# Patient Record
Sex: MALE | HISPANIC OR LATINO | URBAN - METROPOLITAN AREA
[De-identification: names, ages, dates, MRNs, and addresses within clinical notes are randomized per-mention and may not be internally consistent; named-entity substitution may affect disease eponyms.]

---

## 2019-02-12 ENCOUNTER — OFFICE VISIT CONVERTED (OUTPATIENT)
Dept: PULMONOLOGY | Facility: CLINIC | Age: 39
End: 2019-02-12
Attending: INTERNAL MEDICINE

## 2019-05-07 ENCOUNTER — OFFICE VISIT CONVERTED (OUTPATIENT)
Dept: PULMONOLOGY | Facility: CLINIC | Age: 39
End: 2019-05-07
Attending: PHYSICIAN ASSISTANT

## 2019-09-26 ENCOUNTER — OFFICE VISIT CONVERTED (OUTPATIENT)
Dept: PULMONOLOGY | Facility: CLINIC | Age: 39
End: 2019-09-26
Attending: PHYSICIAN ASSISTANT

## 2020-06-09 ENCOUNTER — OFFICE VISIT CONVERTED (OUTPATIENT)
Dept: PULMONOLOGY | Facility: CLINIC | Age: 40
End: 2020-06-09
Attending: NURSE PRACTITIONER

## 2021-05-28 VITALS
DIASTOLIC BLOOD PRESSURE: 80 MMHG | HEART RATE: 68 BPM | SYSTOLIC BLOOD PRESSURE: 111 MMHG | HEIGHT: 70 IN | WEIGHT: 161 LBS | TEMPERATURE: 98.1 F | OXYGEN SATURATION: 99 % | HEART RATE: 64 BPM | RESPIRATION RATE: 12 BRPM | WEIGHT: 167.06 LBS | TEMPERATURE: 97.9 F | BODY MASS INDEX: 23.92 KG/M2 | HEIGHT: 70 IN | SYSTOLIC BLOOD PRESSURE: 130 MMHG | DIASTOLIC BLOOD PRESSURE: 74 MMHG | OXYGEN SATURATION: 100 % | BODY MASS INDEX: 23.05 KG/M2 | RESPIRATION RATE: 14 BRPM

## 2021-05-28 VITALS
DIASTOLIC BLOOD PRESSURE: 72 MMHG | RESPIRATION RATE: 16 BRPM | HEART RATE: 63 BPM | TEMPERATURE: 98.4 F | OXYGEN SATURATION: 100 % | BODY MASS INDEX: 23.92 KG/M2 | SYSTOLIC BLOOD PRESSURE: 116 MMHG | HEIGHT: 70 IN | WEIGHT: 167.12 LBS

## 2021-05-28 NOTE — PROGRESS NOTES
Patient: SEVERIANO SPIVEY     Acct: [id number]     Report: #[id number]  UNIT #: [id number]     : 1980    Encounter Date:2019  PRIMARY CARE: GLENROY ELLIOTT  ***Signed***  --------------------------------------------------------------------------------------------------------------------  Chief Complaint      Encounter Date      May 7, 2019            Primary Care Provider      [facility]            Referring Provider      [facility]            Patient Complaint      Patient is complaining of      Pt here for 2 month follow up/chest ct, lab results/Asthma            VITALS      Height 5 ft 10 in / 177.8 cm      Weight 167 lbs 1 oz / 75.37238 kg      BSA 1.93 m2      BMI 24.0 kg/m2      Temperature 97.9 F / 36.61 C - Oral      Pulse 64      Respirations 12      Blood Pressure 111/74 Sitting, Right Arm      Pulse Oximetry 100%, room air      Initial Exhaled Nitrous Oxide      Date:  2019      Exhaled Nitrous Oxide Results:  18            HPI      The patient is a very pleasant 39 year old white male seen by Dr. Aldridge as a     new patient in 2019. He was referred for cough and dyspnea. He has a     history of exercise induced asthma and cough variant asthma. At his last office     visit he wanted to stop taking flovent to see if he could be weaned off.  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     He was noted to have a 3 mm calcified granuloma otherwise no nodules, no ground     glass opacities or mediastinal lymphadenopathy appreciated. He also had IgE     testing which was abnormal with an elevated IgE level of 237. He has been taking    Singulair for the past 6 months for seasonal allergies and asthma and feels like    it is helping him somewhat however over the past 4 weeks or so he has had     increased dry cough, sometimes productive of  white or clear sputum. He is also     complaining of some scratchy throat. He denies wheezing, hemoptysis, fever or     chills, nasal congestion or postnasal drip.             I reviewed her Review of Systems, medical, surgical and family history and agree    with those as entered.      Copies To:   Brien Aldridge      Constitutional:  Denies: Fatigue, Fever, Weight gain, Weight loss, Chills,     Insomnia, Other      Respiratory/Breathing:  Complains of: Cough; Denies: Shortness of air, Wheezing,    Hemoptysis, Pleuritic pain, Other      Endocrine:  Denies: Polydipsia, Polyuria, Heat/cold intolerance, Diabetes, Other      Eyes:  Denies: Blurred vision, Vision Changes, Other      Ears, nose, mouth, throat:  Denies: Mouth lesions, Thrush, Throat pain,     Hoarseness, Allergies/Hay Fever, Post Nasal Drip, Headaches, Recent Head Injury,    Nose Bleeding, Neck Stiffness, Thyroid Mass, Hearing Loss, Ear Fullness, Dry     Mouth, Nasal or Sinus Pain, Dry Lips, Nasal discharge, Nasal congestion, Other      Cardiovascular:  Denies: Palpitations, Syncope, Claudication, Chest Pain, Wake     up Gasping for air, Leg Swelling, Irregular Heart Rate, Cyanosis, Dyspnea on     Exertion, Other      Gastrointestinal:  Denies: Nausea, Constipation, Diarrhea, Abdominal pain,     Vomiting, Difficulty Swallowing, Reflux/Heartburn, Dysphagia, Jaundice,     Bloating, Melena, Bloody stools, Other      Genitourinary:  Denies: Urinary frequency, Incontinence, Hematuria, Urgency,     Nocturia, Dysuria, Testicular problems, Other      Musculoskeletal:  Denies: Joint Pain, Joint Stiffness, Joint Swelling, Myalgias,    Other      Hematologic/lymphatic:  DENIES: Lymphadenopathy, Bruising, Bleeding tendencies,     Other      Neurological:  Denies: Headache, Numbness, Weakness, Seizures, Other      Psychiatric:  Denies: Anxiety, Appropriate Effect, Depression, Other      Sleep:  No: Excessive daytime sleep, Morning Headache?, Snoring,  Insomnia?, Stop    breathing at sleep?, Other      Integumentary:  Denies: Rash, Dry skin, Skin Warm to Touch, Other      Immunologic/Allergic:  Denies: Latex allergy, Seasonal allergies, Asthma,     Urticaria, Eczema, Other      Immunization status:  No: Up to date            FAMILY/SOCIAL/MEDICAL HX      Surgical History:  Yes: Other Surgeries (vasectomy ); No: AAA Repair, Abdominal     Surgery, Adenoids, Angioplasty, Appendectomy, Back Surgery, Bladder Surgery,     Bowel Surgery, Breast Surgery, CABG, Carotid Stenosis, Cholecystectomy, Ear     Surgery, Eye Surgery, Head Surgery, Hernia Surgery, Kidney Surgery, Nose     Surgery, Oral Surgery, Orthopedic Surgery, Prostatectomy, Rectal Surgery, Spinal    Surgery, Testicular Surgery, Throat Surgery, Tonsils, Valve Replacement,     Vascular Surgery      Stroke - Family Hx:  Grandparent      Heart - Family Hx:  Grandparent      Diabetes - Family Hx:  Grandparent      Is Father Still Living?:  Yes      Is Mother Still Living?:  Yes       Family History:  Yes      Social History:  No Tobacco Use, No Alcohol Use, No Recreational Drug use      Smoking status:  Never smoker      Anticoagulation Therapy:  No      Antibiotic Prophylaxis:  No      Medical History:  Yes: Shortness Of Breath; No: Alcoholism, Allergies, Anemia,     Arthritis, Asthma, Atrial Fibrillation, Blood Disease, Broken Bones, Cataracts,     Chemical Dependency, Chemotherapy/Cancer, Chronic Bronchitis/COPD, Emphysema,     Chronic Liver Disease, Colon Trouble, Colitis, Diverticulitis, Congestive Heart     Failu, Deafness or Ringing Ears, Convulsions, Depression, Anxiety, Bipolar     Disorder, PTSD, Diabetes, Epilepsy, Seizures, Forgetfullness, Glaucoma, Gall     Stones, Gout, Head Injury, Heart Attack, Heart Murmur, GERD, Hemorrhoids/Rectal     Prob, Hepatitis, Hiatal Hernia, High Blood Pressure, High Cholesterol, HIV (Do     not ask - volu, Jaundice, Kidney or Bladder Disease, Kidney Stones, Migrane      Headaches, Mitral Valve Prolapse, Night sweats, Phlebitis, Psychiatric Care,     Reflux Disease, Rheumatic Fever, Sexually Transmitted Dis, Sinus Trouble, Skin     Disease/Psoriais/Ecz, Stroke, Thyroid Problem, Tuberculosis or Pos TB Te,     Miscellaneous Medical/oth      Psychiatric History      None            PREVENTION      Hx Influenza Vaccination:  Yes      Date Influenza Vaccine Given:  Feb 1, 2019      Influenza Vaccine Declined:  No      Hx Pneumococcal Vaccination:  No      Encouraged to follow-up with:  PCP regarding preventative exams.      Chart initiated by      Aminta Galvan MA            ALLERGIES/MEDICATIONS      Allergies:        Coded Allergies:             Penicillins (Verified  Allergy, Intermediate, 5/7/19)                  rash      Medications    Last Reconciled on 5/7/19 08:46 by BLUE CHO      Cetirizine Hcl (ZyrTEC) 10 Mg Tablet      10 MG PO HS, #30 TAB 5 Refills         Prov: Ladan Calhoun PA-C         5/7/19       Albuterol (Proair HFA) 8.5 Gm Inh      2 PUFFS INH RTQ4H, #1 INH 0 Refills         Reported         5/7/19       Fluticasone (Flovent HFA) 110 Mcg Inhaler      4 PUFFS INH RTBID, INH         Reported         2/12/19       Montelukast Sodium (Montelukast*) 10 Mg Tablet      10 MG PO QDAY, TAB         Reported         2/12/19      Current Medications      Current Medications Reviewed 5/7/19            EXAM      GEN-patient appears stated age resting comfortable in no acute distress      Eyes-PERRL,  conjunctiva are normal in appearance extraocular muscles are     intact, no scleral icterus      Lymphatic-no swollen or enlarged cervical nodes, or axillary node, or femoral n    odes, or supraclavicular nodes      Mouth very poor dentition, no erythema no ulcerations oropharynx appears normal     no exudate no evidence of postnasal drip, MP       Neck-there are no palpable supraclavicular or cervical adenopathy, thyroid is     normal in appearance no apparent nodules,  there is no inspiratory or expiratory     stridor      Respiratory-grossly clear to auscultation, no wheezes, rhonchi or crackles     appreciated, normal work of breathing noted noted.        Cardiovascular-the heart rate is normal and regular S1 and S2 present with no     murmur or extra heart sounds, there is no JVD or pedal edema present      GI-the abdomen is normal in appearance, bowel sounds present and normal in all     quadrants no hepatosplenomegaly or masses felt      Extremities-no clubbing is present, pulses present in all extremities, capillary    refill time is normal      Musculoskeletal-Normal strength in upper and lower extremities, inspection shows    no evidence of muscle atrophy      Skin-skin is normal in appearance it is warm and dry, no rashes present, no     evidence of cyanosis, palpation reveals no masses      Neurological-the patient is alert and oriented to time place and person, moves     all 4 extremities, normal gait, normal affect and mood, CN2-12 intact      Psych-normal judgment and insight is good, normal mood and affect, alert and     oriented to person, place, and time, and date      Vtials      Vitals:             Height 5 ft 10 in / 177.8 cm           Weight 167 lbs 1 oz / 75.67609 kg           BSA 1.93 m2           BMI 24.0 kg/m2           Temperature 97.9 F / 36.61 C - Oral           Pulse 64           Respirations 12           Blood Pressure 111/74 Sitting, Right Arm           Pulse Oximetry 100%, room air            REVIEW      Results Reviewed      PCCS Results Reviewed?:  Yes Prev Lab Results, Yes Prev Radiology Results, Yes     Previous [facility] Records (I personally reviewed the patient's records from     [facility]. )            Assessment      Elevated IgE level - R76.8            Notes      New Medications      * ALBUTEROL (Proair HFA) 8.5 GM INH: 2 PUFFS INH RTQ4H #1      * CETIRIZINE HCL (ZyrTEC) 10 MG TABLET: 10 MG PO HS #30      New Diagnostics      *  Rast Aspergillus Fum IGE, Week         Dx: Elevated IgE level - R76.8      * Upper Resp [place] Imm Prof, Week         Dx: Elevated IgE level - R76.8      * Histoplasma Antigen Urine, Routine         Dx: Elevated IgE level - R76.8      ASSESSMENT:       1. Exercise induced asthma.      2. Cough variant asthma.      3. Seasonal allergies.       4. Elevated IgE level.             PLAN:      1.  Continue on Singulair and add zyrtec for his seasonal allergies.       2.  I have asked the patient if he would like to go back on flovent given that     he is having increased dry cough during allergy season and he would like to try     to avoid using a daily maintenance inhaler. He can continue albuterol 30 minutes    before exercise and as needed for shortness of breath, coughing or wheezing.  If    he is consistently needing to use his albuterol rescue inhaler more than 3 times    a week he should go back on flovent.       3.  I discussed the patient's recent test results with him including CT results     and IgE level. His IgE is elevated and already on Singulair so I will do a     fungal work up with RAST aspergillus IgE and urine histoplasma antigen.       4. The patient is concerned about his allergies so I will do an [facility]     respiratory panel.       5. Follow up in 3-4 months with Dr. Aldridge to discuss test results, sooner if     needed.            Patient Education      Patient Education Provided:  How to use an Inhaler      Time Spent:  > 50% /[facility]                 Disclaimer: Converted document may not contain table formatting or lab diagrams. Please see [employer] for the authenticated document.

## 2021-05-28 NOTE — PROGRESS NOTES
Patient: BOUBACAR SPIVEY     Acct: [id number]     Report: #[id number]  UNIT #: [id number]     : 1980    Encounter Date:2020  PRIMARY CARE: GLENROY ELLIOTT  ***Signed***  --------------------------------------------------------------------------------------------------------------------  TELEHEALTH NOTE      History of Present Illness      Chief Complaint: 6 month F/U            Boubacar Spivey is presenting for evaluation via Telehealth visit by phone. Verbal     consent obtained before beginning visit.            Provider spent 14 minutes with the patient during telehealth visit.            The following staff were present during the visit: Lorena Charles CMA, Tabitha PISANO            The patient is a 40 year old male patient of Dr. Aldridge's who has a history of     cough variant asthma and exercise induced asthma  who presents for Telehealth     visit today. The patient states since his last office visit his breathing is at     baseline. The patient states he will get short of breath that is worse during     particular seasons and when it is cold outside. The patient states he is     currently only using an albuterol inhaler as needed. The patient states he uses     his albuterol inhaler before he runs and when he exercises and he typically     finds he uses his albuterol inhaler only about once a month. The patient denies     any wheezing, cough,  fever or chills, night sweats, hemoptysis,  purulent     sputum production, swollen glands in head and neck, unintentional weight loss,     chest pain or chest tightness, abdominal pain, nausea or vomiting or diarrhea.     The patient states he has not had to take any antibiotics or steroids since his     last office visit and denies any hospitalizations since his last office visit.     The patient states he is able to perform all his activities of daily living     without difficulty. The patient states he is moving to [place] in August. The      patient denies any nocturnal awakenings.             I reviewed the Review of Systems, medical, surgical and family history and agree    with those as entered.               Physical exam is deferred due to Telehealth visit.            I personally reviewed BLUE Curtis last office visit note.                          Past Med History      HX: Asthma      Never Smoker      Vaccines Flu Current, no Pneumonia      Overview of Symptoms            Denies Fever, body aches, chills, SOA, cough            Allergies/Medications      Allergies:        Coded Allergies:             PENICILLINS (Verified  Allergy, Intermediate, 9/26/19)                  rash      Medications    Last Reconciled on 6/9/20 08:31 by DAINA PANDYA       Cetirizine Hcl (zyrTEC) 10 Mg Tablet      10 MG PO HS for 90 Days, #90 TAB 4 Refills         Prov: DAINA PANDYA [facility]         6/9/20       Albuterol (Proair HFA) 8.5 Gm Inh      2 PUFFS INH RTQ4H, #1 INH 3 Refills         Prov: DAINA PANDYA [facility]         6/9/20       Montelukast Sodium (Montelukast*) 10 Mg Tablet      10 MG PO QDAY for 90 Days, #90 TAB 4 Refills         Prov: DAINA PANDYA [facility]         6/9/20            Plan/Instructions      Ambulatory Assessment/Plan:        Notes      Renewed Medications      * MONTELUKAST SODIUM (Montelukast*) 10 MG TABLET: 10 MG PO QDAY 90 Days #90      * ALBUTEROL (Proair HFA) 8.5 GM INH: 2 PUFFS INH RTQ4H #1      * CETIRIZINE HCL (zyrTEC) 10 MG TABLET: 10 MG PO HS 90 Days #90      Plan/Instructions      * Plan Of Care: ()            * Chronic conditions reviewed and taken into consideration for today's treatment       plan.      * Patient instructed to seek medical attention urgently for new or worsening       symptoms.      * Patient was educated/instructed on their diagnosis, treatment and medications       prior to discharge from the clinic today.            ASSESSMENT:      1. Exercise induced asthma.       2. Cough variant asthma.        3. Seasonal allergies.       4. Elevated IgE level improving.       5. Never smoker.             PLAN:      1. Continue albuterol inhaler as needed.  The patient is advised that if he     needs to use his albuterol inhaler 2-3 times a week to notify our office because     we will need to step up therapy and he will need to start a daily inhaler. The     patient verbalized understanding and compliance.       2. Continue Singulair and Zyrtec everyday as prescribed.       3. The patient is advised to call the office, call 911 or go to the ER for any     new or worsening symptoms.       4. The patient states he is up to date with flu vaccine.       5. The patient states he is moving to [place] in August and will not be     following up with our office after that. The patient is advised to establish     care with a provider in [place]. The patient verbalized understanding and     compliance.       6. Follow up as needed.      Codes:  Phone Eval [id number] [phone] [id number]            Electronically signed by DAINA PANDYA [facility]  06/18/2020 11:53       Disclaimer: Converted document may not contain table formatting or lab diagrams. Please see [employer] for the authenticated document.

## 2021-05-28 NOTE — PROGRESS NOTES
Patient: SEVERIANO SPIVEY     Acct: [id number]     Report: #[id number]  UNIT #: [id number]     : 1980    Encounter Date:2019  PRIMARY CARE: GLENROY ELLIOTT  ***Signed***  --------------------------------------------------------------------------------------------------------------------  Chief Complaint      Encounter Date      Sep 26, 2019            Primary Care Provider      [facility]            Referring Provider      [facility]            Patient Complaint      Patient is complaining of      Patient here today for 4 month follow up            VITALS      Height 70 in / 177.8 cm      Weight 161 lbs  / 73.947771 kg      BSA 1.90 m2      BMI 23.1 kg/m2      Temperature 98.1 F / 36.72 C - Oral      Pulse 68      Respirations 14      Blood Pressure 130/80 Sitting, Left Arm      Pulse Oximetry 99%, room air      Initial Exhaled Nitrous Oxide      Date:  2019      Exhaled Nitrous Oxide Results:  18            HPI      The patient is a pleasant 39 year old male patient of Dr. Aldridge's last seen by    me in the office in 2019.  He has had a history of cough variant asthma and     exercise induced asthma. He had wanted to stop using daily maintenance inhaler,     so we stopped Flovent at his last visit, continued him on albuterol before     exercising as needed and he feels like he has done well with that.  He feels     like his cough is minimal, not very bothersome to him. Other than with exercise,    he finds he typically only needs his albuterol once a month or so.  He has also     had repeat blood work done since his last visit including repeating an IgE l    evel. He had an [place] respiratory panel done and fungal serologies. His     IgE is now lower from 237 down to 168 on labs from last month. All of his [place] respiratory panel allergens were negative with the exception of [place]     which was still considered low, but not totally negative. His fungal  serologies     were negative. He denies any increased dyspnea, wheezing, hemoptysis, fever or     chills.  He is no longer taking a Zyrtec as prescribed, but still is taking     Singulair.  He does admit to sometimes a scratchy throat feeling.            I have reviewed her ROS, medical, surgical and family history and agree with     those as entered in the chart.      Copies To:   Brien Aldridge ;            LONG      Constitutional:  Denies: Fatigue, Fever, Weight gain, Weight loss, Chills,     Insomnia, Other      Respiratory/Breathing:  Complains of: Cough; Denies: Shortness of air, Wheezing,    Hemoptysis, Pleuritic pain, Other      Endocrine:  Denies: Polydipsia, Polyuria, Heat/cold intolerance, Diabetes, Other      Eyes:  Denies: Blurred vision, Vision Changes, Other      Ears, nose, mouth, throat:  Denies: Congestion, Dysphagia, Hearing Changes, Nose    Bleeding, Nasal Discharge, Throat pain, Tinnitus, Other      Cardiovascular:  Denies: Chest Pain, Exertional dyspnea, Peripheral Edema,     Palpitations, Syncope, Wake up Gasping for air, Orthopnea, Tachycardia, Other      Gastrointestinal:  Denies: Abdominal pain/cramping, Bloody stools, Constipation,    Diarrhea, Melena, Nausea, Vomiting, Other      Genitourinary:  Denies: Dysuria, Urinary frequency, Incontinence, Hematuria,     Urgency, Other      Musculoskeletal:  Denies: Joint Pain, Joint Stiffness, Joint Swelling, Myalgias,    Other      Hematologic/lymphatic:  DENIES: Lymphadenopathy, Bruising, Bleeding tendencies,     Other      Neurologic:  Denies: Headache, Numbness, Weakness, Seizures, Other      Psychiatric:  Denies: Anxiety, Appropriate Effect, Depression, Other      Sleep:  No: Excessive daytime sleep, Morning Headache?, Snoring, Insomnia?, Stop    breathing at sleep?, Other      Integumentary:  Denies: Rash, Dry skin, Skin Warm to Touch, Other            FAMILY/SOCIAL/MEDICAL HX      Surgical History:  Yes: Other Surgeries (vasectomy ); No: AAA  Repair, Abdominal     Surgery, Adenoids, Angioplasty, Appendectomy, Back Surgery, Bladder Surgery,     Bowel Surgery, Breast Surgery, CABG, Carotid Stenosis, Cholecystectomy, Ear     Surgery, Eye Surgery, Head Surgery, Hernia Surgery, Kidney Surgery, Nose     Surgery, Oral Surgery, Orthopedic Surgery, Prostatectomy, Rectal Surgery, Spinal    Surgery, Testicular Surgery, Throat Surgery, Tonsils, Valve Replacement,     Vascular Surgery      Stroke - Family Hx:  Grandparent      Heart - Family Hx:  Grandparent      Diabetes - Family Hx:  Grandparent      Is Father Still Living?:  Yes      Is Mother Still Living?:  Yes       Family History:  Yes      Social History:  No Tobacco Use, No Alcohol Use, No Recreational Drug use      Smoking status:  Never smoker      Anticoagulation Therapy:  No      Antibiotic Prophylaxis:  No      Medical History:  Yes: Shortness Of Breath; No: Alcoholism, Allergies, Anemia,     Arthritis, Asthma, Atrial Fibrillation, Blood Disease, Broken Bones, Cataracts,     Chemical Dependency, Chemotherapy/Cancer, Chronic Bronchitis/COPD, Emphysema,     Chronic Liver Disease, Colon Trouble, Colitis, Diverticulitis, Congestive Heart     Failu, Deafness or Ringing Ears, Convulsions, Depression, Anxiety, Bipolar     Disorder, PTSD, Diabetes, Epilepsy, Seizures, Forgetfullness, Glaucoma, Gall     Stones, Gout, Head Injury, Heart Attack, Heart Murmur, GERD, Hemorrhoids/Rectal     Prob, Hepatitis, Hiatal Hernia, High Blood Pressure, High Cholesterol, HIV (Do     not ask - volu, Jaundice, Kidney or Bladder Disease, Kidney Stones, Migrane     Headaches, Mitral Valve Prolapse, Night sweats, Phlebitis, Psychiatric Care,     Reflux Disease, Rheumatic Fever, Sexually Transmitted Dis, Sinus Trouble, Skin     Disease/Psoriais/Ecz, Stroke, Thyroid Problem, Tuberculosis or Pos TB Te,     Miscellaneous Medical/oth      Psychiatric History      none            PREVENTION      Hx Influenza Vaccination:  Yes      Date  Influenza Vaccine Given:  Feb 1, 2019      Influenza Vaccine Declined:  No      2 or More Falls Past Year?:  No      Fall Past Year with Injury?:  No      Hx Pneumococcal Vaccination:  No      Encouraged to follow-up with:  PCP regarding preventative exams.      Chart initiated by      Lorena Charles CMA            ALLERGIES/MEDICATIONS      Allergies:        Coded Allergies:             PENICILLINS (Verified  Allergy, Intermediate, 9/26/19)                  rash      Medications    Last Reconciled on 9/26/19 08:19 by BLUE CHO      Cetirizine Hcl (ZyrTEC) 10 Mg Tablet      10 MG PO HS, #30 TAB 5 Refills         Prov: Ladan Calhoun PA-C         5/7/19       Albuterol (Proair HFA) 8.5 Gm Inh      2 PUFFS INH RTQ4H, #1 INH 0 Refills         Reported         5/7/19       Fluticasone (Flovent HFA) 110 Mcg Inhaler      4 PUFFS INH RTBID, INH         Reported         2/12/19       Montelukast Sodium (Montelukast*) 10 Mg Tablet      10 MG PO QDAY, TAB         Reported         2/12/19      Current Medications      Current Medications Reviewed 9/26/19            EXAM      GEN-patient appears stated age resting comfortable in no acute distress      Eyes-PERRL,  conjunctiva are normal in appearance extraocular muscles are in    tact, no scleral icterus      Nasal-both nares are patent turbinates appear normal no polyps seen no nasal     discharge or ulcerations      Ears-tympanic membranes are normal no erythema no bulging, normal to inspection      Lymphatic-no swollen or enlarged cervical nodes, or axillary node, or femoral     nodes, or supraclavicular nodes      Mouth normal dentition, no erythema no ulcerations oropharynx appears normal no     exudate no evidence of postnasal drip, MP(default value)      Neck-there are no palpable supraclavicular or cervical adenopathy, thyroid is     normal in appearance no apparent nodules, there is no inspiratory or expiratory     stridor      Respiratory-Lungs are grossly  clear to auscultation.  No wheezes, rhonchi or     crackles appreciated.  Normal work of breathing.        Cardiovascular-the heart rate is normal and regular S1 and S2 present with no     murmur or extra heart sounds, there is no JVD or pedal edema present      GI-the abdomen is normal in appearance, bowel sounds present and normal in all     quadrants no hepatosplenomegaly or masses felt      Extremities-no clubbing is present, pulses present in all extremities, capillary    refill time is normal      Musculoskeletal-Normal strength in upper and lower extremities, inspection shows    no evidence of muscle atrophy      Skin-skin is normal in appearance it is warm and dry, no rashes present, no     evidence of cyanosis, palpation reveals no masses      Neurological-the patient is alert and oriented to time place and person, moves     all 4 extremities, normal gait, normal affect and mood, CN2-12 intact      Psych-normal judgment and insight is good, normal mood and affect, alert and     oriented to person, place, time and date      Vitals      Vitals:             Height 70 in / 177.8 cm           Weight 161 lbs  / 73.372874 kg           BSA 1.90 m2           BMI 23.1 kg/m2           Temperature 98.1 F / 36.72 C - Oral           Pulse 68           Respirations 14           Blood Pressure 130/80 Sitting, Left Arm           Pulse Oximetry 99%, room air            REVIEW      Results Reviewed      PCCS Results Reviewed?:  Yes Prev Lab Results, Yes Prev Radiology Results, Yes     Previous [facility] Records      Lab Results      I personally reviewed previous lab work, imaging and provider notes including     outside labs done at [place].            Assessment      Notes      Renewed Medications      * MONTELUKAST SODIUM (Montelukast*) 10 MG TABLET:         From: 10 MG PO QDAY         To: 10 MG PO QDAY 90 Days #90      * ALBUTEROL (Proair HFA) 8.5 GM INH: 2 PUFFS INH RTQ4H #1      * CETIRIZINE HCL (ZyrTEC) 10 MG TABLET:          From: 10 MG PO HS #30         To: 10 MG PO HS 90 Days #90      ASSESSMENT:       1. Exercise induced asthma.      2. Cough variant asthma.      3. Seasonal allergies.       4. Elevated IgE level, improving.              PLAN:      1. At this time, I have gone over recent lab results with the patient and     discussed with him that his respiratory panel was really negative for all     allergens except low on [place].  His IgE level is coming down and his fungal     serologies were negative. He should continue on Singulair 10 mg nightly.  As he     is not using Zyrtec as prescribed and is still having some scratchy throat, I     have instructed him to start taking that daily rather than as needed. His asthma    seems to be well-controlled on albuterol alone and he prefers not to use a     maintenance inhaler which is why Flovent was stopped.        2.  He should continue albuterol 30 minutes before exercise and as needed for     increased dyspnea, coughing or wheezing.        3. Follow up in six months with Dr. Aldridge, sooner if needed.            Patient Education      Patient Education Provided:  How to use an Inhaler      Time Spent:  > 50% /[facility]                 Disclaimer: Converted document may not contain table formatting or lab diagrams. Please see [employer] for the authenticated document.

## 2021-05-28 NOTE — PROGRESS NOTES
Patient: SEVERIANO SPIVEY     Acct: [id number]     Report: #[id number]  UNIT #: [id number]     : 1980    Encounter Date:2019  PRIMARY CARE: GLENROY ELLIOTT  ***Signed***  --------------------------------------------------------------------------------------------------------------------  Chief Complaint      Encounter Date      2019            Primary Care Provider      [facility]            Referring Provider      [facility]            Patient Complaint      Patient is complaining of      new patient/soa            VITALS      Height 5 ft 10 in / 177.8 cm      Weight 167 lbs 2 oz / 75.28053 kg      BSA 1.93 m2      BMI 24.0 kg/m2      Temperature 98.4 F / 36.89 C - Oral      Pulse 63      Respirations 16      Blood Pressure 116/72 Sitting, Right Arm      Pulse Oximetry 100%, room air      Initial Exhaled Nitrous Oxide      Date:  2019      Exhaled Nitrous Oxide Results:  18            HPI      The patient is a 38 year old -American male here today to be evaluated for     cough and shortness of breath.  Symptoms started approximately in . The     patient noticed that when he would do PT, he would develop some cough and     shortness of breath after starting the exercise routine.  He would not develop     it immediately.  It would be several minutes into it. He would develop uncontro    llable dry cough that lasts for several minutes, was associated with some chest     tightness and some difficulties breathing. He noticed his exercise tolerance     decreasing significantly.  He also noticed that he would have cough later on in     the evening after he would indulge in some alcohol.  His wife also noticed that     he occasionally episodically wheezed as well.  The patient is a lifelong never     smoker. 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 He underwent PFTs in the past,     found not  to have any asthma.  The patient was recently started on Flovent,     Singulair and albuterol.  Since taking the Flovent and the Singulair, the     patient states that his symptoms have now nearly completely resolved.  He no     longer has cough in the evening, he no longer has a cough when he does his     activities or his exercise. He will occasionally still have some mild shortness     of breath after engaging in activities for several minutes, but overall feels     about a 90% improvement in his symptoms since starting these medications.  The     patient is able to participate in PT without having any difficulties at this     time.            ROS      Constitutional:  Complains of: Fatigue; Denies: Fever, Weight gain, Weight loss,    Chills, Insomnia, Other      Respiratory/Breathing:  Complains of: Shortness of air, Wheezing, Cough; Denies:    Hemoptysis, Pleuritic pain, Other      Endocrine:  Denies: Polydipsia, Polyuria, Heat/cold intolerance, Diabetes, Other      Eyes:  Denies: Blurred vision, Vision Changes, Other      Ears, nose, mouth, throat:  Denies: Mouth lesions, Thrush, Throat pain,     Hoarseness, Allergies/Hay Fever, Post Nasal Drip, Headaches, Recent Head Injury,    Nose Bleeding, Neck Stiffness, Thyroid Mass, Hearing Loss, Ear Fullness, Dry     Mouth, Nasal or Sinus Pain, Dry Lips, Nasal discharge, Nasal congestion, Other      Cardiovascular:  Denies: Palpitations, Syncope, Claudication, Chest Pain, Wake     up Gasping for air, Leg Swelling, Irregular Heart Rate, Cyanosis, Dyspnea on     Exertion, Other      Gastrointestinal:  Denies: Nausea, Constipation, Diarrhea, Abdominal pain,     Vomiting, Difficulty Swallowing, Reflux/Heartburn, Dysphagia, Jaundice,     Bloating, Melena, Bloody stools, Other      Genitourinary:  Denies: Urinary frequency, Incontinence, Hematuria, Urgency,     Nocturia, Dysuria, Testicular problems, Other      Musculoskeletal:  Denies: Joint Pain, Joint Stiffness, Joint  Swelling, Myalgias,    Other      Hematologic/lymphatic:  DENIES: Lymphadenopathy, Bruising, Bleeding tendencies,     Other      Neurological:  Denies: Headache, Numbness, Weakness, Seizures, Other      Psychiatric:  Denies: Anxiety, Appropriate Effect, Depression, Other      Sleep:  No: Excessive daytime sleep, Morning Headache?, Snoring, Insomnia?, Stop    breathing at sleep?, Other      Integumentary:  Denies: Rash, Dry skin, Skin Warm to Touch, Other      Immunologic/Allergic:  Denies: Latex allergy, Seasonal allergies, Asthma,     Urticaria, Eczema, Other      Immunization status:  No: Up to date            FAMILY/SOCIAL/MEDICAL HX      Surgical History:  Yes: Other Surgeries (vasectomy ); No: AAA Repair, Abdominal     Surgery, Adenoids, Angioplasty, Appendectomy, Back Surgery, Bladder Surgery,     Bowel Surgery, Breast Surgery, CABG, Carotid Stenosis, Cholecystectomy, Ear S    urgery, Eye Surgery, Head Surgery, Hernia Surgery, Kidney Surgery, Nose Surgery,    Oral Surgery, Orthopedic Surgery, Prostatectomy, Rectal Surgery, Spinal Surgery,    Testicular Surgery, Throat Surgery, Tonsils, Valve Replacement, Vascular Surgery      Stroke - Family Hx:  Grandparent      Heart - Family Hx:  Grandparent      Diabetes - Family Hx:  Grandparent      Is Father Still Living?:  Yes      Is Mother Still Living?:  Yes       Family History:  Yes      Social History:  No Tobacco Use, No Alcohol Use, No Recreational Drug use      Smoking status:  Never smoker      Anticoagulation Therapy:  No      Antibiotic Prophylaxis:  No      Medical History:  Yes: Shortness Of Breath; No: Alcoholism, Allergies, Anemia,     Arthritis, Asthma, Atrial Fibrillation, Blood Disease, Broken Bones, Cataracts,     Chemical Dependency, Chemotherapy/Cancer, Chronic Bronchitis/COPD, Emphysema,     Chronic Liver Disease, Colon Trouble, Colitis, Diverticulitis, Congestive Heart     Failu, Deafness or Ringing Ears, Convulsions, Depression, Anxiety,  Bipolar     Disorder, PTSD, Diabetes, Epilepsy, Seizures, Forgetfullness, Glaucoma, Gall     Stones, Gout, Head Injury, Heart Attack, Heart Murmur, GERD, Hemorrhoids/Rectal     Prob, Hepatitis, Hiatal Hernia, High Blood Pressure, High Cholesterol, HIV (Do     not ask - volu, Jaundice, Kidney or Bladder Disease, Kidney Stones, Migrane     Headaches, Mitral Valve Prolapse, Night sweats, Phlebitis, Psychiatric Care,     Reflux Disease, Rheumatic Fever, Sexually Transmitted Dis, Sinus Trouble, Skin     Disease/Psoriais/Ecz, Stroke, Thyroid Problem, Tuberculosis or Pos TB Te,     Miscellaneous Medical/oth      Psychiatric History      none            PREVENTION      Hx Influenza Vaccination:  Yes      Date Influenza Vaccine Given:  Feb 1, 2019      Influenza Vaccine Declined:  No      2 or More Falls Past Year?:  No      Fall Past Year with Injury?:  No      Hx Pneumococcal Vaccination:  No      Encouraged to follow-up with:  PCP regarding preventative exams.      Chart initiated by      rosalba martinez ma            ALLERGIES/MEDICATIONS      Allergies:        Coded Allergies:             No Known Drug Allergies (Verified  Allergy, 2/12/19)      Medications    Last Reconciled on 2/12/19 08:41 by HAZEL TOWNSEND MD      Fluticasone (Flovent HFA) 110 Mcg Inhaler      4 PUFFS INH RTBID, INH         Reported         2/12/19       Montelukast Sodium (Montelukast*) 10 Mg Tablet      10 MG PO QDAY, TAB         Reported         2/12/19      Current Medications      Current Medications Reviewed 2/12/19            EXAM      GEN-patient appears stated age resting comfortable in no acute distress      Eyes-PERRL,  conjunctiva are normal in appearance extraocular muscles are     intact, no scleral icterus      Nasal-both nares are patent turbinates appear normal no polyps seen no nasal     discharge or ulcerations      Ears-tympanic membranes are normal no erythema no bulging, normal to inspection      Lymphatic-no swollen or  enlarged cervical nodes, or axillary node, or femoral     nodes, or supraclavicular nodes      Mouth normal dentition, no erythema no ulcerations oropharynx appears normal no     exudate no evidence of postnasal drip, MP(1)      Neck-there are no palpable supraclavicular or cervical adenopathy, thyroid is     normal in appearance no apparent nodules, there is no inspiratory or expiratory     stridor      Respiratory-patient exhibits normal work of breathing, speaking in full     sentences without difficulty, the chest is normal in appearance, clear to     auscultation with no wheezes rales or rhonchi, chest is normal to percussion on     both the right and left sides      Cardiovascular-the heart rate is normal and regular S1 and S2 present with no     murmur or extra heart sounds, there is no JVD or pedal edema present      GI-the abdomen is normal in appearance, bowel sounds present and normal in all     quadrants no hepatosplenomegaly or masses felt      Extremities-no clubbing is present, pulses present in all extremities, capillary     refill time is normal      Musculoskeletal-Normal strength in upper and lower extremities, inspection shows     no evidence of muscle atrophy      Skin-skin is normal in appearance it is warm and dry, no rashes present, no     evidence of cyanosis, palpation reveals no masses      Neurological-the patient is alert and oriented to time place and person, moves     all 4 extremities, normal gait, normal affect and mood, CN2-12 intact      Psych-normal judgment and insight is good, normal mood and affect, alert and     oriented to person, place, and time, and date      Vtials      Vitals:             Height 5 ft 10 in / 177.8 cm           Weight 167 lbs 2 oz / 75.18583 kg           BSA 1.93 m2           BMI 24.0 kg/m2           Temperature 98.4 F / 36.89 C - Oral           Pulse 63           Respirations 16           Blood Pressure 116/72 Sitting, Right Arm           Pulse Oximetry  100%, room air            REVIEW      Results Reviewed      PCCS Results Reviewed?:  Yes Prev Lab Results, Yes Prev Radiology Results, Yes     Previous [facility] Records            Assessment      Asthma - J45.909            Chronic cough - R05            Notes      New Medications      * MONTELUKAST SODIUM (Montelukast*) 10 MG TABLET: 10 MG PO QDAY      * Fluticasone (Flovent HFA) 110 MCG INHALER: 4 PUFFS INH RTBID      New Diagnostics      * Immunoglobulin  E (I, Week         Dx: Asthma - J45.909      * Chest W/O Cont High Resolution, SCHEDULED PROCEDURE         Dx: Asthma - J45.909      ASSESSMENT:       1. Exercise induced asthma.      2. Cough variant asthma.            PLAN:      1. Continue Singulair.      2.  Instructed patient to take two puffs of albuterol 30 minutes prior to     exercise.      3. Patient would like to come off the Flovent.  Instructed patient we will try     to wean off the Flovent at this time and if his symptoms reoccurred to restart     the Flovent.      4.  Obtain high resolution CT scan without contrast given the patient's     significant burn pit exposure to rule out any underlying possible bronchiolitis.            5. We will see patient back in two months.      6.  I have personally reviewed laboratory data, imaging as well as previous me    dical records.            Patient Education      Education resources provided:  Yes      Patient Education Provided:  Acute Asthma                 Disclaimer: Converted document may not contain table formatting or lab diagrams. Please see [employer] for the authenticated document.